# Patient Record
Sex: FEMALE | Race: WHITE | NOT HISPANIC OR LATINO | URBAN - METROPOLITAN AREA
[De-identification: names, ages, dates, MRNs, and addresses within clinical notes are randomized per-mention and may not be internally consistent; named-entity substitution may affect disease eponyms.]

---

## 2018-05-08 NOTE — PATIENT DISCUSSION
Consider Symfony IOL due to history of multifocal contact lens wear and she works all day on computer.

## 2019-07-10 NOTE — PATIENT DISCUSSION
The visual field is suspicious for glaucomatous damage.  Need confirmation of VF defect with repeated test.

## 2020-06-06 NOTE — PATIENT DISCUSSION
Observe. Isotretinoin Pregnancy And Lactation Text: This medication is Pregnancy Category X and is considered extremely dangerous during pregnancy. It is unknown if it is excreted in breast milk.

## 2022-01-05 ENCOUNTER — NEW PATIENT (OUTPATIENT)
Dept: URBAN - METROPOLITAN AREA CLINIC 26 | Facility: CLINIC | Age: 32
End: 2022-01-05

## 2022-01-05 VITALS
HEIGHT: 62 IN | BODY MASS INDEX: 23.92 KG/M2 | WEIGHT: 130 LBS | DIASTOLIC BLOOD PRESSURE: 105 MMHG | HEART RATE: 85 BPM | SYSTOLIC BLOOD PRESSURE: 151 MMHG

## 2022-01-05 DIAGNOSIS — H43.393: ICD-10-CM

## 2022-01-05 DIAGNOSIS — G43.B1: ICD-10-CM

## 2022-01-05 DIAGNOSIS — H35.373: ICD-10-CM

## 2022-01-05 DIAGNOSIS — I10: ICD-10-CM

## 2022-01-05 PROCEDURE — 92134 CPTRZ OPH DX IMG PST SGM RTA: CPT

## 2022-01-05 PROCEDURE — 99204 OFFICE O/P NEW MOD 45 MIN: CPT

## 2022-01-05 PROCEDURE — 92083 EXTENDED VISUAL FIELD XM: CPT

## 2022-01-05 PROCEDURE — 92250 FUNDUS PHOTOGRAPHY W/I&R: CPT

## 2022-01-05 ASSESSMENT — TONOMETRY
OS_IOP_MMHG: 17
OD_IOP_MMHG: 20

## 2022-01-05 ASSESSMENT — VISUAL ACUITY
OS_SC: 20/15
OD_SC: 20/15-1

## 2022-01-05 NOTE — PATIENT DISCUSSION
Reviewed that they tend to [address] over a few weeks, and then may not happen for many years. Patient instructed to return if they do not clear over a few weeks for further evaluation.

## 2022-01-05 NOTE — PATIENT DISCUSSION
1 5 22 BP HAS BEEN OUT OF CONTROL - ON SUNDAY IT /130, TODAY 152/102, HR 85 - BEING MANAGED BY DR MCKEON HER PRIMARY CARE.